# Patient Record
Sex: FEMALE | Race: WHITE | Employment: UNEMPLOYED | URBAN - METROPOLITAN AREA
[De-identification: names, ages, dates, MRNs, and addresses within clinical notes are randomized per-mention and may not be internally consistent; named-entity substitution may affect disease eponyms.]

---

## 2024-01-10 ENCOUNTER — INITIAL CONSULT (OUTPATIENT)
Dept: CARDIOLOGY CLINIC | Age: 74
End: 2024-01-10
Payer: MEDICARE

## 2024-01-10 VITALS
WEIGHT: 247 LBS | DIASTOLIC BLOOD PRESSURE: 60 MMHG | BODY MASS INDEX: 41.15 KG/M2 | HEART RATE: 81 BPM | HEIGHT: 65 IN | SYSTOLIC BLOOD PRESSURE: 114 MMHG

## 2024-01-10 DIAGNOSIS — I83.893 VARICOSE VEINS OF BOTH LEGS WITH EDEMA: Primary | ICD-10-CM

## 2024-01-10 DIAGNOSIS — E78.5 DYSLIPIDEMIA: ICD-10-CM

## 2024-01-10 DIAGNOSIS — E11.9 TYPE 2 DIABETES MELLITUS WITHOUT COMPLICATION, WITHOUT LONG-TERM CURRENT USE OF INSULIN (HCC): ICD-10-CM

## 2024-01-10 DIAGNOSIS — E66.01 MORBID OBESITY (HCC): ICD-10-CM

## 2024-01-10 DIAGNOSIS — I10 PRIMARY HYPERTENSION: ICD-10-CM

## 2024-01-10 PROCEDURE — 93000 ELECTROCARDIOGRAM COMPLETE: CPT | Performed by: INTERNAL MEDICINE

## 2024-01-10 PROCEDURE — 3074F SYST BP LT 130 MM HG: CPT | Performed by: INTERNAL MEDICINE

## 2024-01-10 PROCEDURE — 1123F ACP DISCUSS/DSCN MKR DOCD: CPT | Performed by: INTERNAL MEDICINE

## 2024-01-10 PROCEDURE — 99204 OFFICE O/P NEW MOD 45 MIN: CPT | Performed by: INTERNAL MEDICINE

## 2024-01-10 PROCEDURE — 3078F DIAST BP <80 MM HG: CPT | Performed by: INTERNAL MEDICINE

## 2024-01-10 RX ORDER — LISINOPRIL AND HYDROCHLOROTHIAZIDE 20; 12.5 MG/1; MG/1
1 TABLET ORAL DAILY
COMMUNITY

## 2024-01-10 RX ORDER — PRAVASTATIN SODIUM 40 MG
40 TABLET ORAL DAILY
COMMUNITY

## 2024-01-10 NOTE — PROGRESS NOTES
Vein \"LEG PROBLEM Questionnaire\"  Do you have prominent leg veins?  Yes   Do you have any skin discoloration?  Yes  Do you have any healed or active sores? No  Do you have swelling of the legs?  Yes  Do you have a family history of varicose veins? Yes  Does your profession involve pro-longed        standing or heavy lifting?    No  7.   Have you been fighting overweight problems? Yes  8.   Do you have restless legs?   No  9.   Do you have any night time cramps?  Yes, but only once in a while  10. Do you have any of the following in your legs:        Itching I  11.If Yes - Have they worn compression stockings Yes  12. If they have worn compression stockings  Months    
(247 lb)     Body mass index is 41.1 kg/m².    General Appearance:  Morbidly obese/Well Nourished  1. Skin: It is warm & dry. No rashes noted.  2. Eyes: No conjunctival Pallor seen. No jaundice noted.  3. Neck: is supple there is no elevation of JVD. No thyromegaly  4. Respiratory:  Resp Assessment: No abnormal findings.  Resp Auscultation: Vesicular breath sounds without rales or wheezing.  5. Cardiovascular:  Auscultation: Normal S1 & S2, No prominent murmurs  Carotid Arteries: No bruits present  Abdominal Aorta: Non-palpable  Pedal Pulses: 2+ and equal   6. Abdomen:  No masses or tenderness  Liver/Spleen: No Abnormalities Noted, no organomegaly.  7. Musculoskeletal: No joint deformities. No muscle wasting  8. Extremities:   No Cyanosis or Clubbing. There s significant edema L>R with C4 changes.  9. Rectal / genital:   Deferred  10. Neurological/Psychiatric:  Oriented to time, place, and person  Non-anxious    No results found for: \"CKTOTAL\", \"CKMB\", \"CKMBINDEX\", \"TROPONINT\"  BNP:  No results found for: \"BNP\", \"PROBNP\"  PT/INR:  No results found for: \"PTINR\"  No results found for: \"LABA1C\"  No results found for: \"CHOL\", \"TRIG\", \"HDL\", \"LDLCALC\", \"LDLDIRECT\", \"CHOLHDLRATIO\"  No results found for: \"ALT\", \"AST\"  BMP:  No results found for: \"NA\", \"K\", \"CL\", \"CO2\", \"BUN\", \"CREATININE\", \"GLU\"  CMP:  No results found for: \"NA\", \"K\", \"CL\", \"CO2\", \"BUN\", \"CREATININE\", \"GLU\", \"PROT\", \"ALB\", \"CA\"  TSH:  No results found for: \"TSH\", \"TSHHS\"    Echo: not done  Stress Cardiolyte: not done  EKG: normal sinus rhythm, 1st degree AV block       QUALITY MEASURES REVIEWED:  1.CAD:Patient is taking anti platelet agent:No  Patient does not have Hx of documented CAD  2.DYSLIPIDEMIA: Patient is on cholesterol lowering medication:Yes   3.Beta-Blocker therapy for CAD, if prior Myocardial Infarction:No   4.Counselled regarding smoking cessation. No   Patient does not Smoke.  5.Anticoagulation therapy (for A.Fib) No   Does Not have

## 2024-01-10 NOTE — PATIENT INSTRUCTIONS
CHRONIC VENOUS INSUFFICIENCY:yes,    Patient has symptomatic C4 disease     Will order venous US.    Advised patient to continue to wear compression stockings.  Need to Diet Exercise & Loose weight.  Stop smoking.  Increase walking while wearing compression stockings.  Keep feet propped up while seated.    HYPERTENSION:Yes  well controlled on current medical regimen.  - changes in  treatment: no. Patient is on Prinzide  Counseled regarding low salt diet, exercise & weight control.    DYSLIPIDEMIA: yes,   Patient's profile is not available.  Tolerating current medical regimen well yes . Takes Pravachol  Does not tolerate medications well due to side effects  See most recent Lab values:( Reviewed Labs from family Dr. RODRIGO     )  LDL is   HDL is  Diabetes mellitis:yes,   BS under good control no  Hgb A1c avilable yes, 7.5    I spent about 30 min. of time in review of the available data, chart Prep., interviewing patient, obtaining history, performing physical exam, going through decision making analysis for assessment & plans of management on this patient.

## 2024-01-18 ENCOUNTER — TELEPHONE (OUTPATIENT)
Dept: CARDIOLOGY CLINIC | Age: 74
End: 2024-01-18

## 2024-01-18 NOTE — TELEPHONE ENCOUNTER
Called pt to tell them the results of their [place]. as summarized below. Pt verbalized understanding and will come in for an OV to further discuss the results with Dr. De Dios.        Vascular US Duplex Lower Extremity Venous Bilateral      No evidence of deep vein or superficial vein thrombosis in the bilateral lower extremity. Vessels demonstrate normal compressibility, color filling, and phasic and spontaneous flow.    Significant reflux noted in the Right CFV (1.0s), and SSV Distal Calf (2.8s).    Significant reflux noted in the Left GSV Mid Calf Tributary (1.6s), and GSV Distal Calf Tributary (2.5s).     Advise thigh high pressure stockings, 20 to 30 mm of Hg.  Keep feet elevated.  Increase walking.     Arrange OV to review the results with the patient & make recommendations.

## 2024-02-14 ENCOUNTER — OFFICE VISIT (OUTPATIENT)
Dept: CARDIOLOGY CLINIC | Age: 74
End: 2024-02-14
Payer: MEDICARE

## 2024-02-14 VITALS
DIASTOLIC BLOOD PRESSURE: 68 MMHG | SYSTOLIC BLOOD PRESSURE: 138 MMHG | WEIGHT: 243 LBS | HEART RATE: 68 BPM | BODY MASS INDEX: 40.48 KG/M2 | HEIGHT: 65 IN

## 2024-02-14 DIAGNOSIS — I83.893 VARICOSE VEINS OF BOTH LEGS WITH EDEMA: Primary | ICD-10-CM

## 2024-02-14 DIAGNOSIS — I10 PRIMARY HYPERTENSION: ICD-10-CM

## 2024-02-14 DIAGNOSIS — E11.9 TYPE 2 DIABETES MELLITUS WITHOUT COMPLICATION, WITHOUT LONG-TERM CURRENT USE OF INSULIN (HCC): ICD-10-CM

## 2024-02-14 DIAGNOSIS — E66.01 MORBID OBESITY (HCC): ICD-10-CM

## 2024-02-14 DIAGNOSIS — E78.5 DYSLIPIDEMIA: ICD-10-CM

## 2024-02-14 PROCEDURE — 99214 OFFICE O/P EST MOD 30 MIN: CPT | Performed by: INTERNAL MEDICINE

## 2024-02-14 PROCEDURE — 3078F DIAST BP <80 MM HG: CPT | Performed by: INTERNAL MEDICINE

## 2024-02-14 PROCEDURE — 1123F ACP DISCUSS/DSCN MKR DOCD: CPT | Performed by: INTERNAL MEDICINE

## 2024-02-14 PROCEDURE — 3075F SYST BP GE 130 - 139MM HG: CPT | Performed by: INTERNAL MEDICINE

## 2024-02-14 NOTE — PATIENT INSTRUCTIONS
CHRONIC VENOUS INSUFFICIENCY:yes,               Patient has symptomatic C4 disease    1/16/2024    No evidence of deep vein or superficial vein thrombosis in the bilateral lower extremity. Vessels demonstrate normal compressibility, color filling, and phasic and spontaneous flow.    Significant reflux noted in the Right CFV (1.0s), and SSV Distal Calf (2.8s).    Significant reflux noted in the Left GSV Mid Calf Tributary (1.6s), and GSV Distal Calf Tributary (2.5s).     Advised patient to continue to wear compression stockings.  Need to Diet Exercise & Loose weight.  Increase walking while wearing compression stockings.  Keep feet propped up while seated.     HYPERTENSION:Yes  well controlled on current medical regimen.  - changes in  treatment: no. Patient is on Prinzide  Counseled regarding low salt diet, exercise & weight control.     DYSLIPIDEMIA: yes,   Patient's profile is not available.  Tolerating current medical regimen well yes . Takes Pravachol  Does not tolerate medications well due to side effects  See most recent Lab values:( Reviewed Labs from family Dr. RODRIGO     )  LDL is   HDL is  Diabetes mellitis:yes,   BS under good control no  Hgb A1c avilable yes, 7.5    TESTS ORDERED: Serial ablations of right SSV & Left GSV tributeries.     PREVIOUSLY ORDERED TESTS REVIEWED & DISCUSSED WITH THE PATIENT:     I personally reviewed & interpreted, all previously ordered tests as copied above. Latest Labs are pulled in to the note with dates.   Labs, specially in Reference to Lipid profile, Cardiac testing in the form of Echo ( dated: ), stress tests ( dated: ) & other relevant cardiac testing reviewed with patient & recommendations made based on assessment of the results.    Discussed role of Cardiac risk factors & effects + treatment of co morbidities with patient & advised accordingly.     MEDICATIONS: List of medications patient is currently taking is reviewed in detail with the patient. Discussed any side

## 2024-02-14 NOTE — PROGRESS NOTES
OFFICE PROGRESS NOTES      Carolyn is a 73 y.o. female who has    CHIEF COMPLAINT AS FOLLOWS:  CHEST PAIN: Patient denies any C/O chest pains at this time.      SOB: No C/O SOB at this time.                LEG EDEMA: complains of leg edema, prominent leg veins, skin discoloration, night time cramps, heavy and itching of the legs.    PALPITATIONS: Denies any C/O Palpitations   DIZZINESS: No C/O Dizziness   SYNCOPE: None   OTHER/ ADDITIONAL COMPLAINTS:                                       HPI: Patient is here for F/U on her CVI,  HTN & Dyslipidemia problems.   CVI: B/L Reflux  HTN: Patient has known essential HTN. Has been treated with guideline recommended medical / physical/ diet therapy as stated below.  Dyslipidemia: Patient has known mixed dyslipidemia. Has been treated with guideline recommended medical / physical/ diet therapy as stated below.                Current Outpatient Medications   Medication Sig Dispense Refill    metFORMIN (GLUCOPHAGE) 500 MG tablet Take 1 tablet by mouth 2 times daily (with meals)      pravastatin (PRAVACHOL) 40 MG tablet Take 1 tablet by mouth daily      lisinopril-hydroCHLOROthiazide (PRINZIDE;ZESTORETIC) 20-12.5 MG per tablet Take 1 tablet by mouth daily       No current facility-administered medications for this visit.     Allergies: Patient has no known allergies.  Review of Systems:    Constitutional: Negative for diaphoresis and fatigue  Respiratory: Negative for shortness of breath  Cardiovascular: Negative for chest pain, dyspnea on exertion, claudication, edema, irregular heartbeat, murmur, palpitations or shortness of breath  Musculoskeletal: Negative for muscle pain, muscular weakness, negative for pain in arm and leg or swelling in foot and leg    Objective:  /68 (Site: Left Upper Arm, Position: Sitting, Cuff Size: Large Adult)   Pulse 68   Ht 1.651 m (5' 5\")   Wt 110.2 kg (243 lb)   BMI 40.44 kg/m²   Wt Readings from Last 3 Encounters:   02/14/24 110.2 kg

## 2024-02-28 ENCOUNTER — TELEPHONE (OUTPATIENT)
Dept: CARDIOLOGY CLINIC | Age: 74
End: 2024-02-28

## 2024-02-28 DIAGNOSIS — I87.2 PERIPHERAL VENOUS INSUFFICIENCY: Primary | ICD-10-CM

## 2024-02-28 RX ORDER — DIAZEPAM 5 MG/1
5 TABLET ORAL EVERY 6 HOURS PRN
Qty: 2 TABLET | Refills: 0 | OUTPATIENT
Start: 2024-02-28 | End: 2024-03-05

## 2024-02-28 NOTE — TELEPHONE ENCOUNTER
Pre- Instructions Venous ablation     Date of Procedure: 3/4/2024 Time: 8 am Arrival Time: 7:45 am      Please keep yourself hydrated for 24 hours before the procedure ( drink lots of water)  Please  the Valium that was sent to [facility]  and bring it with you to the procedure.   Please wear loose comfortable clothing.   Patient does not need to wear compression stockings to the procedure.  You will need someone with you to drive you home.   Please eat a light breakfast in the morning  Please continue to take medications as needed.    SR Confirmed with patient.    Reviewed instructions and patient voiced understanding.  Medication called in to pharmacy.

## 2024-03-04 ENCOUNTER — PROCEDURE VISIT (OUTPATIENT)
Dept: CARDIOLOGY CLINIC | Age: 74
End: 2024-03-04
Payer: MEDICARE

## 2024-03-04 DIAGNOSIS — E11.9 TYPE 2 DIABETES MELLITUS WITHOUT COMPLICATION, WITHOUT LONG-TERM CURRENT USE OF INSULIN (HCC): ICD-10-CM

## 2024-03-04 DIAGNOSIS — E66.01 MORBID OBESITY (HCC): ICD-10-CM

## 2024-03-04 DIAGNOSIS — I10 PRIMARY HYPERTENSION: ICD-10-CM

## 2024-03-04 DIAGNOSIS — E78.5 DYSLIPIDEMIA: ICD-10-CM

## 2024-03-04 DIAGNOSIS — I83.893 VARICOSE VEINS OF BOTH LEGS WITH EDEMA: Primary | ICD-10-CM

## 2024-03-04 PROCEDURE — 36482 ENDOVEN THER CHEM ADHES 1ST: CPT | Performed by: INTERNAL MEDICINE

## 2024-03-04 NOTE — PROGRESS NOTES
Endovenous Ablation with VenaSeal Operative Report    3/4/2024    Subjective:  Carolyn is a 73 y.o. female    Procedure Performed:    Endovenous Ablation of the Great Saphenous Vein with VenaSeal™ Closure System of the  Left side.    Indication  Duplex ultrasound was used to map out the insufficient saphenous vein, and access was determined and marked on the overlying skin. The depth and diameter of the vein(s) to be treated was documented. The patient was placed supine on the procedure table and the leg was prepped and draped using sterile technique.     Ultrasound guidance was again used to localize the access site. 1% lidocaine was injected as a local anesthetic in the subcutaneous tissues at the target location in the GSV in the lower leg. Using ultrasound guidance, access was gained at this location with the 19 gauge thin walled access needle and followed by introduction of a short guidewire, location confirmed with ultrasound. A small, 3 mm incision was made at the access site to allow for introduction and placement of the 7 Fr x7cm introducer/dilator. The dilator and guidewire were removed. The 0.035 guidewire from the Venaseal kit was then introduced and positioned at the saphenofemoral junction using ultrasound guidance. The 80 cm 7 Fr introducer sheath/dilator was positioned 5cm from the saphenofemoral junction. The guidewire and dilator were removed, and the remaining sheath was flushed with sterile saline, with the syringe remaining in place prior to the next steps.     The cyanoacrylate adhesive was precisely primed into the 5 F delivery catheter and this catheter/syringe combination was attached within the dispenser gun. This \"assembly\" was introduced through the 7 F sheath and positioned 5 cm caudal of the saphenofemoral junction under ultrasound guidance. The steps from the IFU were followed for dispensing amounts, locations and compression times, e.g 2 aliquots proximally with 3 minutes of

## 2024-03-04 NOTE — PROGRESS NOTES
VENOUS PRE-PROCEDURE H & P  3/4/2024    Subjective:  Carolyn is a 73 y.o. female    GENERAL - A-Ox3- In no respiratory distress  Heart - Regular rhythm, normal S1, S2, no gallops, no murmurs, no friction rubs  Chest - CTA & percussion    Vein Exam:     Right ext - varicosities, spider and reticular veins Yes, skin changes Yes, ulcers No, edema Yes    Left ext  - varicosities, spider and reticular veins Yes, skin changes Yes, ulcers No, edema Yes    Reflex Study:   1/16/2024    No evidence of deep vein or superficial vein thrombosis in the bilateral lower extremity. Vessels demonstrate normal compressibility, color filling, and phasic and spontaneous flow.    Significant reflux noted in the Right CFV (1.0s), and SSV Distal Calf (2.8s).    Significant reflux noted in the Left GSV Mid Calf Tributary (1.6s), and GSV Distal Calf Tributary (2.5s).    Assessment:   Patient has symptomatic C4 venous disease.    Plan:   Ablation of Left GSV.  Informed consent obtained.    Murphy De Dios MD, FACC

## 2024-03-05 ENCOUNTER — TELEPHONE (OUTPATIENT)
Dept: CARDIOLOGY CLINIC | Age: 74
End: 2024-03-05

## 2024-03-05 DIAGNOSIS — I87.2 CHRONIC VENOUS INSUFFICIENCY: Primary | ICD-10-CM

## 2024-03-05 RX ORDER — DIAZEPAM 5 MG/1
5 TABLET ORAL PRN
Qty: 2 TABLET | Refills: 0 | OUTPATIENT
Start: 2024-03-05 | End: 2024-03-12

## 2024-03-05 NOTE — TELEPHONE ENCOUNTER
Pre- Instructions Venous ablation     Date of Procedure: 3/11/24 Time: 8 AM Arrival Time: 750 AM      Please keep yourself hydrated for 24 hours before the procedure ( drink lots of water)  Please  the Valium that was sent to [facility]  and bring it with you to the procedure.   Please wear loose comfortable clothing.   Patient does not need to wear compression stockings to the procedure.  You will need someone with you to drive you home.   Please eat a light breakfast in the morning  Please continue to take medications as needed.    Tania Confirmed with patient.  Reviewed instructions and patient voiced understanding.  Medication called in to pharmacy.

## 2024-03-08 ENCOUNTER — TELEPHONE (OUTPATIENT)
Dept: CARDIOLOGY CLINIC | Age: 74
End: 2024-03-08

## 2024-03-08 NOTE — TELEPHONE ENCOUNTER
Called pt to tell them the results of their US as summarized below. Pt verbalized understanding.        Vascular US Duplex Lower Extremity Venous Left      Left Common Femoral Vein: Patent, normal phasicity, spontaneous, normal augmentation, compressible.    The Left GSV is non-compressible with no evidence of flow from the knee to the distal calf.

## 2024-03-11 ENCOUNTER — PROCEDURE VISIT (OUTPATIENT)
Dept: CARDIOLOGY CLINIC | Age: 74
End: 2024-03-11
Payer: MEDICARE

## 2024-03-11 DIAGNOSIS — E66.01 MORBID OBESITY (HCC): ICD-10-CM

## 2024-03-11 DIAGNOSIS — I83.893 VARICOSE VEINS OF BOTH LEGS WITH EDEMA: Primary | ICD-10-CM

## 2024-03-11 DIAGNOSIS — I10 PRIMARY HYPERTENSION: ICD-10-CM

## 2024-03-11 DIAGNOSIS — E11.9 TYPE 2 DIABETES MELLITUS WITHOUT COMPLICATION, WITHOUT LONG-TERM CURRENT USE OF INSULIN (HCC): ICD-10-CM

## 2024-03-11 DIAGNOSIS — E78.5 DYSLIPIDEMIA: ICD-10-CM

## 2024-03-11 PROCEDURE — 36482 ENDOVEN THER CHEM ADHES 1ST: CPT | Performed by: INTERNAL MEDICINE

## 2024-03-11 NOTE — PROGRESS NOTES
Endovenous Ablation with VenaSeal Operative Report    3/11/2024    Subjective:  Carolyn is a 73 y.o. female    Procedure Performed:    Endovenous Ablation of the Small Saphenous Vein with VenaSeal™ Closure System of the  Right side.    Indication  Duplex ultrasound was used to map out the insufficient saphenous vein, and access was determined and marked on the overlying skin. The depth and diameter of the vein(s) to be treated was documented. The patient was placed supine on the procedure table and the leg was prepped and draped using sterile technique.     Ultrasound guidance was again used to localize the access site. 1% lidocaine was injected as a local anesthetic in the subcutaneous tissues at the target location in the GSV in the lower leg. Using ultrasound guidance, access was gained at this location with the 19 gauge thin walled access needle and followed by introduction of a short guidewire, location confirmed with ultrasound. A small, 3 mm incision was made at the access site to allow for introduction and placement of the 7 Fr x7cm introducer/dilator. The dilator and guidewire were removed. The 0.035 guidewire from the Venaseal kit was then introduced and positioned at the saphenofemoral junction using ultrasound guidance. The 80 cm 7 Fr introducer sheath/dilator was positioned 5cm from the saphenofemoral junction. The guidewire and dilator were removed, and the remaining sheath was flushed with sterile saline, with the syringe remaining in place prior to the next steps.     The cyanoacrylate adhesive was precisely primed into the 5 F delivery catheter and this catheter/syringe combination was attached within the dispenser gun. This \"assembly\" was introduced through the 7 F sheath and positioned 5 cm caudal of the saphenofemoral junction under ultrasound guidance. The steps from the IFU were followed for dispensing amounts, locations and compression times, e.g 2 aliquots proximally with 3 minutes of

## 2024-03-11 NOTE — PROGRESS NOTES
VENOUS PRE-PROCEDURE H & P  3/11/2024    Subjective:  Carolyn is a 73 y.o. female    GENERAL - A-Ox3- In no respiratory distress  Heart - Regular rhythm, normal S1, S2, no gallops, no murmurs, no friction rubs  Chest - CTA & percussion    Vein Exam:     Right ext - varicosities, spider and reticular veins Yes, skin changes Yes, ulcers No, edema Yes    Left ext  - varicosities, spider and reticular veins Yes, skin changes Yes, ulcers No, edema Yes    Reflex Study:   1/16/2024    No evidence of deep vein or superficial vein thrombosis in the bilateral lower extremity. Vessels demonstrate normal compressibility, color filling, and phasic and spontaneous flow.    Significant reflux noted in the Right CFV (1.0s), and SSV Distal Calf (2.8s).    Significant reflux noted in the Left GSV Mid Calf Tributary (1.6s), and GSV Distal Calf Tributary (2.5s).    Assessment:   Patient has symptomatic C4 venous disease.    Plan:   Ablation of right SSV.  Informed consent obtained.    Murphy De Dios MD, FACC

## 2024-03-14 ENCOUNTER — TELEPHONE (OUTPATIENT)
Dept: CARDIOLOGY CLINIC | Age: 74
End: 2024-03-14

## 2024-03-14 NOTE — TELEPHONE ENCOUNTER
Left message for pt telling them the normal results of their US as summarized below.         Vascular US Duplex Lower Extremity Venous Right      Right Popliteal Vein: Patent, normal phasicity, spontaneous, normal augmentation, compressible.    The Right SSV is non-compressible with no evidence of flow just past the saphenopopliteal junction to the distal calf.

## 2024-04-16 ENCOUNTER — TELEPHONE (OUTPATIENT)
Dept: CARDIOLOGY CLINIC | Age: 74
End: 2024-04-16

## 2024-04-16 NOTE — TELEPHONE ENCOUNTER
Vascular US Duplex Lower Extremity Venous Right      Right Popliteal Vein and Left CFV: Patent, normal phasicity, spontaneous, normal augmentation, compressible.    Right SSV is non compressible with no evidence of flow from just after the SPJ to the distal calf s/p ablation.    Left GSV is non compressible with no evidence of flow from the knee to the distal calf s/p ablation.     Left msg on  regarding results

## 2024-04-25 ENCOUNTER — OFFICE VISIT (OUTPATIENT)
Dept: CARDIOLOGY CLINIC | Age: 74
End: 2024-04-25
Payer: MEDICARE

## 2024-04-25 VITALS
HEIGHT: 65 IN | DIASTOLIC BLOOD PRESSURE: 72 MMHG | HEART RATE: 77 BPM | OXYGEN SATURATION: 97 % | SYSTOLIC BLOOD PRESSURE: 136 MMHG | WEIGHT: 236 LBS | BODY MASS INDEX: 39.32 KG/M2

## 2024-04-25 DIAGNOSIS — E11.9 TYPE 2 DIABETES MELLITUS WITHOUT COMPLICATION, WITHOUT LONG-TERM CURRENT USE OF INSULIN (HCC): ICD-10-CM

## 2024-04-25 DIAGNOSIS — I83.893 VARICOSE VEINS OF BOTH LEGS WITH EDEMA: Primary | ICD-10-CM

## 2024-04-25 DIAGNOSIS — E78.5 DYSLIPIDEMIA: ICD-10-CM

## 2024-04-25 DIAGNOSIS — I10 PRIMARY HYPERTENSION: ICD-10-CM

## 2024-04-25 DIAGNOSIS — E66.01 MORBID OBESITY (HCC): ICD-10-CM

## 2024-04-25 PROCEDURE — 3075F SYST BP GE 130 - 139MM HG: CPT | Performed by: INTERNAL MEDICINE

## 2024-04-25 PROCEDURE — 3078F DIAST BP <80 MM HG: CPT | Performed by: INTERNAL MEDICINE

## 2024-04-25 PROCEDURE — 99213 OFFICE O/P EST LOW 20 MIN: CPT | Performed by: INTERNAL MEDICINE

## 2024-04-25 PROCEDURE — 1123F ACP DISCUSS/DSCN MKR DOCD: CPT | Performed by: INTERNAL MEDICINE

## 2024-04-25 RX ORDER — LATANOPROST 50 UG/ML
SOLUTION/ DROPS OPHTHALMIC
COMMUNITY
Start: 2024-03-18

## 2024-04-25 RX ORDER — LOSARTAN POTASSIUM AND HYDROCHLOROTHIAZIDE 12.5; 1 MG/1; MG/1
1 TABLET ORAL DAILY
COMMUNITY
Start: 2024-03-20

## 2024-04-25 RX ORDER — AMLODIPINE BESYLATE 5 MG/1
5 TABLET ORAL DAILY
COMMUNITY
Start: 2024-04-11

## 2024-04-25 RX ORDER — DULOXETIN HYDROCHLORIDE 20 MG/1
20 CAPSULE, DELAYED RELEASE ORAL DAILY
COMMUNITY
Start: 2024-03-25

## 2024-04-25 NOTE — PROGRESS NOTES
OFFICE PROGRESS NOTES      Carolyn is a 73 y.o. female who has    CHIEF COMPLAINT AS FOLLOWS:  CHEST PAIN:  Patient denies any C/O chest pains at this time.      SOB: No C/O SOB at this time.                LEG EDEMA: CVI symptoms are better.    PALPITATIONS: Denies any C/O Palpitations   DIZZINESS: No C/O Dizziness   SYNCOPE: None   OTHER/ ADDITIONAL COMPLAINTS:                                     HPI: Patient is here for F/U on her CVI, HTN & Dyslipidemia problems.   CVI: S/P Ablation.  HTN: Patient has known essential HTN. Has been treated with guideline recommended medical / physical/ diet therapy as stated below.  Dyslipidemia: Patient has known mixed dyslipidemia. Has been treated with guideline recommended medical / physical/ diet therapy as stated below.                Current Outpatient Medications   Medication Sig Dispense Refill    losartan-hydroCHLOROthiazide (HYZAAR) 100-12.5 MG per tablet Take 1 tablet by mouth daily      latanoprost (XALATAN) 0.005 % ophthalmic solution INSTILL 1 DROP INTO EACH EYE AT BEDTIME      amLODIPine (NORVASC) 5 MG tablet Take 1 tablet by mouth daily      DULoxetine (CYMBALTA) 20 MG extended release capsule Take 1 capsule by mouth daily      metFORMIN (GLUCOPHAGE) 500 MG tablet Take 1 tablet by mouth 2 times daily (with meals)      pravastatin (PRAVACHOL) 40 MG tablet Take 1 tablet by mouth daily       No current facility-administered medications for this visit.     Allergies: Patient has no known allergies.  Review of Systems:    Constitutional: Negative for diaphoresis and fatigue  Respiratory: Negative for shortness of breath  Cardiovascular: Negative for chest pain, dyspnea on exertion, claudication, edema, irregular heartbeat, murmur, palpitations or shortness of breath  Musculoskeletal: Negative for muscle pain, muscular weakness, negative for pain in arm and leg or swelling in foot and leg    Objective:  /72 (Site: Left Upper Arm, Position: Sitting, Cuff Size:

## 2024-04-25 NOTE — PATIENT INSTRUCTIONS
& recommendations made based on assessment of the results.    Discussed role of Cardiac risk factors & effects + treatment of co morbidities with patient & advised accordingly.     MEDICATIONS: List of medications patient is currently taking is reviewed in detail with the patient. Discussed any side effects or problems taking the medication.     Recommend Continue present management & medications as listed.     AFFIRMATION: I spent at least 20 minutes of time reviewing patient's history, previous & current medical problems & all Labs + testing. This includes chart prep even prior to the vosit. Various goals are discussed and multiple questions answered.Relevant concelling performed.     Office follow up in six months.

## 2024-09-11 ENCOUNTER — TELEPHONE (OUTPATIENT)
Dept: CARDIOLOGY CLINIC | Age: 74
End: 2024-09-11

## 2024-09-12 ENCOUNTER — OFFICE VISIT (OUTPATIENT)
Dept: CARDIOLOGY CLINIC | Age: 74
End: 2024-09-12

## 2024-09-12 VITALS
SYSTOLIC BLOOD PRESSURE: 138 MMHG | DIASTOLIC BLOOD PRESSURE: 78 MMHG | HEIGHT: 65 IN | BODY MASS INDEX: 40.65 KG/M2 | WEIGHT: 244 LBS | HEART RATE: 72 BPM

## 2024-09-12 DIAGNOSIS — I83.893 VARICOSE VEINS OF BOTH LEGS WITH EDEMA: Primary | ICD-10-CM

## 2024-09-12 DIAGNOSIS — E78.5 DYSLIPIDEMIA: ICD-10-CM

## 2024-09-12 DIAGNOSIS — E11.9 TYPE 2 DIABETES MELLITUS WITHOUT COMPLICATION, WITHOUT LONG-TERM CURRENT USE OF INSULIN (HCC): ICD-10-CM

## 2024-09-12 DIAGNOSIS — E66.01 MORBID OBESITY (HCC): ICD-10-CM

## 2024-09-12 DIAGNOSIS — I10 PRIMARY HYPERTENSION: ICD-10-CM

## 2025-03-10 NOTE — PROGRESS NOTES
Patient Name: Carolyn Jain  : 1950  MRN# [id number]    REASON FOR VISIT: 6 month follow  Patient Active Problem List    Diagnosis Date Noted    Primary hypertension 01/10/2024    Dyslipidemia 01/10/2024    Type 2 diabetes mellitus without complication, without long-term current use of insulin (HCC) 01/10/2024    Morbid obesity 01/10/2024    Varicose veins of both legs with edema 01/10/2024     CURRENT SX:     LABS:No results found for: \"CHOL\", \"TRIG\", \"HDL\", \"LDLDIRECT\", \"CHOLHDLRATIO\", \"TSH\"No results found for: \"LABA1C\", \"[id number]\"

## 2025-03-13 ENCOUNTER — OFFICE VISIT (OUTPATIENT)
Dept: CARDIOLOGY CLINIC | Age: 75
End: 2025-03-13
Payer: MEDICARE

## 2025-03-13 VITALS
WEIGHT: 243.8 LBS | DIASTOLIC BLOOD PRESSURE: 60 MMHG | HEIGHT: 65 IN | SYSTOLIC BLOOD PRESSURE: 108 MMHG | BODY MASS INDEX: 40.62 KG/M2 | HEART RATE: 69 BPM

## 2025-03-13 DIAGNOSIS — E66.01 MORBID OBESITY: ICD-10-CM

## 2025-03-13 DIAGNOSIS — E11.9 TYPE 2 DIABETES MELLITUS WITHOUT COMPLICATION, WITHOUT LONG-TERM CURRENT USE OF INSULIN (HCC): ICD-10-CM

## 2025-03-13 DIAGNOSIS — E78.5 DYSLIPIDEMIA: ICD-10-CM

## 2025-03-13 DIAGNOSIS — I10 PRIMARY HYPERTENSION: Primary | ICD-10-CM

## 2025-03-13 DIAGNOSIS — I83.893 VARICOSE VEINS OF BOTH LEGS WITH EDEMA: ICD-10-CM

## 2025-03-13 PROCEDURE — 3078F DIAST BP <80 MM HG: CPT | Performed by: INTERNAL MEDICINE

## 2025-03-13 PROCEDURE — 1159F MED LIST DOCD IN RCRD: CPT | Performed by: INTERNAL MEDICINE

## 2025-03-13 PROCEDURE — 1160F RVW MEDS BY RX/DR IN RCRD: CPT | Performed by: INTERNAL MEDICINE

## 2025-03-13 PROCEDURE — 3074F SYST BP LT 130 MM HG: CPT | Performed by: INTERNAL MEDICINE

## 2025-03-13 PROCEDURE — 1123F ACP DISCUSS/DSCN MKR DOCD: CPT | Performed by: INTERNAL MEDICINE

## 2025-03-13 PROCEDURE — 99213 OFFICE O/P EST LOW 20 MIN: CPT | Performed by: INTERNAL MEDICINE

## 2025-03-13 NOTE — PATIENT INSTRUCTIONS
medications as listed.     AFFIRMATION: I reviewed patient's history, previous & current medical problems & all Labs + testing. This includes chart prep even prior to the vosit. Various goals are discussed and multiple questions answered.Relevant concelling performed.     Office follow up in one year.

## 2025-03-13 NOTE — PROGRESS NOTES
CLINICAL STAFF DOCUMENTATION    Dr. Murphy Jain  1950  [id number]    Have you had any Chest Pain recently? - No  Have you had any Shortness of Breath - No  Have you had any dizziness - No  Have you had any palpitations recently? - No  Do you have any edema - swelling in No    If Yes - Have they worn compression stockings Yes      When did you have your last labs drawn getting it done next week   What doctor ordered PCP  Do we have the labs in their chart No    Do you have a surgery or procedure scheduled in the near future - No      Do use tobacco products? - No  Do you drink alcohol? - Yes, weekly   Do you use any illicit drugs? - No  Caffeine? - Yes  How much caffeine? .2  cups

## 2025-03-13 NOTE — PROGRESS NOTES
OFFICE PROGRESS NOTES      Carolyn is a 74 y.o. female who has    CHIEF COMPLAINT AS FOLLOWS:  CHEST PAIN:  Patient denies any C/O chest pains at this time.      SOB: No C/O SOB at this time.                LEG EDEMA: CVI symptoms are better.    PALPITATIONS: Denies any C/O Palpitations   DIZZINESS: No C/O Dizziness   SYNCOPE: None   OTHER/ ADDITIONAL COMPLAINTS:                                     HPI: Patient is here for F/U on his CVI, HTN & Dyslipidemia problems.   CCVI: S/P Ablations.  HTN: Patient has known essential HTN. Has been treated with guideline recommended medical / physical/ diet therapy as stated below.  Dyslipidemia: Patient has known mixed dyslipidemia. Has been treated with guideline recommended medical / physical/ diet therapy as stated below.                Current Outpatient Medications   Medication Sig Dispense Refill    losartan-hydroCHLOROthiazide (HYZAAR) 100-12.5 MG per tablet Take 1 tablet by mouth daily      latanoprost (XALATAN) 0.005 % ophthalmic solution INSTILL 1 DROP INTO EACH EYE AT BEDTIME      amLODIPine (NORVASC) 5 MG tablet Take 1 tablet by mouth daily      DULoxetine (CYMBALTA) 20 MG extended release capsule Take 1 capsule by mouth daily      metFORMIN (GLUCOPHAGE) 500 MG tablet Take 1 tablet by mouth 2 times daily (with meals)      pravastatin (PRAVACHOL) 40 MG tablet Take 1 tablet by mouth daily       No current facility-administered medications for this visit.     Allergies: Patient has no known allergies.  Review of Systems:    Constitutional: Negative for diaphoresis and fatigue  Respiratory: Negative for shortness of breath  Cardiovascular: Negative for chest pain, dyspnea on exertion, claudication, edema, irregular heartbeat, murmur, palpitations or shortness of breath  Musculoskeletal: Negative for muscle pain, muscular weakness, negative for pain in arm and leg or swelling in foot and leg    Objective:  /60 (BP Site: Left Upper Arm, Patient Position: Sitting,